# Patient Record
Sex: FEMALE | Race: WHITE | NOT HISPANIC OR LATINO | Employment: OTHER | URBAN - METROPOLITAN AREA
[De-identification: names, ages, dates, MRNs, and addresses within clinical notes are randomized per-mention and may not be internally consistent; named-entity substitution may affect disease eponyms.]

---

## 2017-01-27 ENCOUNTER — IMPORTED ENCOUNTER (OUTPATIENT)
Dept: URBAN - METROPOLITAN AREA CLINIC 50 | Facility: CLINIC | Age: 67
End: 2017-01-27

## 2017-10-23 ENCOUNTER — IMPORTED ENCOUNTER (OUTPATIENT)
Dept: URBAN - METROPOLITAN AREA CLINIC 50 | Facility: CLINIC | Age: 67
End: 2017-10-23

## 2018-04-27 ENCOUNTER — IMPORTED ENCOUNTER (OUTPATIENT)
Dept: URBAN - METROPOLITAN AREA CLINIC 50 | Facility: CLINIC | Age: 68
End: 2018-04-27

## 2018-09-07 ENCOUNTER — IMPORTED ENCOUNTER (OUTPATIENT)
Dept: URBAN - METROPOLITAN AREA CLINIC 50 | Facility: CLINIC | Age: 68
End: 2018-09-07

## 2018-10-05 ENCOUNTER — IMPORTED ENCOUNTER (OUTPATIENT)
Dept: URBAN - METROPOLITAN AREA CLINIC 50 | Facility: CLINIC | Age: 68
End: 2018-10-05

## 2019-01-14 ENCOUNTER — IMPORTED ENCOUNTER (OUTPATIENT)
Dept: URBAN - METROPOLITAN AREA CLINIC 50 | Facility: CLINIC | Age: 69
End: 2019-01-14

## 2019-03-04 ENCOUNTER — IMPORTED ENCOUNTER (OUTPATIENT)
Dept: URBAN - METROPOLITAN AREA CLINIC 50 | Facility: CLINIC | Age: 69
End: 2019-03-04

## 2019-03-05 ENCOUNTER — IMPORTED ENCOUNTER (OUTPATIENT)
Dept: URBAN - METROPOLITAN AREA CLINIC 50 | Facility: CLINIC | Age: 69
End: 2019-03-05

## 2019-04-22 ENCOUNTER — IMPORTED ENCOUNTER (OUTPATIENT)
Dept: URBAN - METROPOLITAN AREA CLINIC 50 | Facility: CLINIC | Age: 69
End: 2019-04-22

## 2019-04-29 ENCOUNTER — IMPORTED ENCOUNTER (OUTPATIENT)
Dept: URBAN - METROPOLITAN AREA CLINIC 50 | Facility: CLINIC | Age: 69
End: 2019-04-29

## 2019-05-09 ENCOUNTER — IMPORTED ENCOUNTER (OUTPATIENT)
Dept: URBAN - METROPOLITAN AREA CLINIC 50 | Facility: CLINIC | Age: 69
End: 2019-05-09

## 2019-05-09 NOTE — PATIENT DISCUSSION
"""S/P IOL OS: Tecnis ZCB00 23.5 (Target: [place]) +Femto/Arcs +Omidria. Continue post operative instructions and drops per schedule.  """

## 2019-05-15 ENCOUNTER — IMPORTED ENCOUNTER (OUTPATIENT)
Dept: URBAN - METROPOLITAN AREA CLINIC 50 | Facility: CLINIC | Age: 69
End: 2019-05-15

## 2019-05-15 NOTE — PATIENT DISCUSSION
"""S/P IOL OS: Tecnis ZCB00 23.5 (Target: [place]) +Femto/Arcs +Omidria. Continue post operative instructions and drops per schedule.  """

## 2019-05-23 ENCOUNTER — IMPORTED ENCOUNTER (OUTPATIENT)
Dept: URBAN - METROPOLITAN AREA CLINIC 50 | Facility: CLINIC | Age: 69
End: 2019-05-23

## 2019-05-23 NOTE — PATIENT DISCUSSION
"""S/P IOL OD: Tecnis ZCB00 23.5 (Target: [place]) +Femto/Arcs +Omidria. Continue post operative instructions and drops per schedule.  """

## 2019-05-30 ENCOUNTER — IMPORTED ENCOUNTER (OUTPATIENT)
Dept: URBAN - METROPOLITAN AREA CLINIC 50 | Facility: CLINIC | Age: 69
End: 2019-05-30

## 2019-05-30 NOTE — PATIENT DISCUSSION
"""S/P IOL OD: Tecnis ZCB00 23.5 (Target: [place]) +Femto/Arcs +Omidria. Continue post operative instructions and drops per schedule.  """

## 2019-07-10 ENCOUNTER — IMPORTED ENCOUNTER (OUTPATIENT)
Dept: URBAN - METROPOLITAN AREA CLINIC 50 | Facility: CLINIC | Age: 69
End: 2019-07-10

## 2019-07-10 NOTE — PATIENT DISCUSSION
"""Discussed dry eye diagnosis with patient.  Educated patient on proper lid hygiene and stressed importance of lid massages and the use of warm compresses and artificial tears."" ""Continue Oasis Preservative Free Tears both eyes two - four times a day ."" ""Start Warm compresses both eyes at bedtime

## 2019-10-07 ENCOUNTER — IMPORTED ENCOUNTER (OUTPATIENT)
Dept: URBAN - METROPOLITAN AREA CLINIC 50 | Facility: CLINIC | Age: 69
End: 2019-10-07

## 2019-10-07 NOTE — PATIENT DISCUSSION
"""""""Follow drusen without treatment. Call if vision or distortion increases. Recommend regular amsler checks. """

## 2020-02-21 ENCOUNTER — IMPORTED ENCOUNTER (OUTPATIENT)
Dept: URBAN - METROPOLITAN AREA CLINIC 50 | Facility: CLINIC | Age: 70
End: 2020-02-21

## 2020-02-21 NOTE — PATIENT DISCUSSION
"""Start Erythromycin Ointment both eyes at bedtime . "" ""Start Warm compresses both eyes twice a day

## 2020-03-13 ENCOUNTER — IMPORTED ENCOUNTER (OUTPATIENT)
Dept: URBAN - METROPOLITAN AREA CLINIC 50 | Facility: CLINIC | Age: 70
End: 2020-03-13

## 2020-08-07 ENCOUNTER — IMPORTED ENCOUNTER (OUTPATIENT)
Dept: URBAN - METROPOLITAN AREA CLINIC 50 | Facility: CLINIC | Age: 70
End: 2020-08-07

## 2020-11-02 ENCOUNTER — IMPORTED ENCOUNTER (OUTPATIENT)
Dept: URBAN - METROPOLITAN AREA CLINIC 50 | Facility: CLINIC | Age: 70
End: 2020-11-02

## 2021-04-18 ASSESSMENT — VISUAL ACUITY
OD_OTHER: >20/400.
OS_PH: 20/20-1
OD_CC: 20/40
OS_CC: 20/60
OD_SC: 20/20
OD_BAT: 20/30
OS_CC: 20/40
OD_OTHER: 20/30. 20/60.
OD_CC: 20/25
OS_CC: 20/30
OS_CC: 20/30
OS_CC: J1
OS_SC: 20/20
OS_OTHER: 20/40. 20/40.
OD_SC: 20/25-1
OS_OTHER: 20/80. 20/200.
OS_BAT: 20/40
OD_BAT: 20/30
OS_SC: 20/20
OD_CC: J1
OD_SC: 20/25
OS_BAT: 20/40
OS_OTHER: >20/400.
OD_CC: 20/25
OS_SC: 20/20-
OS_CC: 20/20-
OS_SC: 20/20-2
OD_BAT: 20/30
OS_PH: @ 17 IN
OD_OTHER: 20/20. 20/25.
OS_CC: 20/40-
OD_BAT: >20/400
OD_CC: 20/25+2
OS_SC: 20/15
OS_OTHER: 20/20. 20/25.
OD_BAT: 20/80
OD_SC: 20/100
OS_SC: 20/30+1
OD_OTHER: 20/30. 20/60.
OD_SC: 20/20-2
OS_SC: 20/20
OD_OTHER: 20/30. 20/60.
OD_PH: @ 17 IN
OS_OTHER: 20/30. 20/40.
OD_BAT: 20/30
OD_BAT: 20/20
OS_BAT: 20/20
OS_BAT: >20/400
OS_BAT: 20/80
OD_SC: 20/20
OS_CC: 20/30
OS_CC: 20/30
OD_OTHER: 20/30. 20/30.
OD_SC: 20/20-1
OS_BAT: 20/30
OS_OTHER: 20/40. 20/70.
OD_SC: 20/20-2
OD_CC: 20/30-
OD_CC: 20/20
OD_CC: 20/40
OD_OTHER: 20/80. 20/200.

## 2021-04-18 ASSESSMENT — TONOMETRY
OD_IOP_MMHG: 14
OD_IOP_MMHG: 20
OS_IOP_MMHG: 16
OD_IOP_MMHG: 16
OS_IOP_MMHG: 21
OD_IOP_MMHG: 12
OD_IOP_MMHG: 16
OS_IOP_MMHG: 16
OS_IOP_MMHG: 14
OS_IOP_MMHG: 14
OS_IOP_MMHG: 19
OD_IOP_MMHG: 17
OS_IOP_MMHG: 20
OS_IOP_MMHG: 17
OS_IOP_MMHG: 16
OS_IOP_MMHG: 18
OS_IOP_MMHG: 10
OD_IOP_MMHG: 16
OD_IOP_MMHG: 16
OS_IOP_MMHG: 14
OD_IOP_MMHG: 13
OD_IOP_MMHG: 14
OD_IOP_MMHG: 19
OS_IOP_MMHG: 14
OD_IOP_MMHG: 18
OD_IOP_MMHG: 14
OS_IOP_MMHG: 18
OD_IOP_MMHG: 18

## 2021-06-02 NOTE — PATIENT DISCUSSION
"""Continue Artificial tears both eyes two - four times a day ."" ""Continue Erythromycin Ointment both eyes at bedtime . "" ""Continue Warm compresses both eyes twice a day normal mouth and gums/moist

## 2021-10-29 ENCOUNTER — PREPPED CHART (OUTPATIENT)
Dept: URBAN - METROPOLITAN AREA CLINIC 50 | Facility: CLINIC | Age: 71
End: 2021-10-29

## 2021-11-04 ENCOUNTER — COMPREHENSIVE EXAM (OUTPATIENT)
Dept: URBAN - METROPOLITAN AREA CLINIC 50 | Facility: CLINIC | Age: 71
End: 2021-11-04

## 2021-11-04 DIAGNOSIS — H26.493: ICD-10-CM

## 2021-11-04 DIAGNOSIS — H16.223: ICD-10-CM

## 2021-11-04 DIAGNOSIS — H35.363: ICD-10-CM

## 2021-11-04 DIAGNOSIS — H35.373: ICD-10-CM

## 2021-11-04 DIAGNOSIS — H02.834: ICD-10-CM

## 2021-11-04 DIAGNOSIS — H02.831: ICD-10-CM

## 2021-11-04 DIAGNOSIS — E11.9: ICD-10-CM

## 2021-11-04 PROCEDURE — 92134 CPTRZ OPH DX IMG PST SGM RTA: CPT

## 2021-11-04 PROCEDURE — 92014 COMPRE OPH EXAM EST PT 1/>: CPT

## 2021-11-04 ASSESSMENT — VISUAL ACUITY
OD_SC: 20/15
OD_GLARE: 20/20
OS_GLARE: 20/20
OS_SC: 20/15
OD_GLARE: 20/20
OS_GLARE: 20/20
OU_SC: 20/15

## 2021-11-04 ASSESSMENT — TONOMETRY
OS_IOP_MMHG: 16
OD_IOP_MMHG: 16

## 2021-11-04 NOTE — PATIENT DISCUSSION
Will need Ptosis VF and external photos to confirm patient meets medical insurance criteria for blepharoplasty. Patient wants to schedule at the beginning of the year.

## 2021-11-19 ENCOUNTER — DIAGNOSTICS - PTOSIS VF/PHOTOS (OUTPATIENT)
Dept: URBAN - METROPOLITAN AREA CLINIC 50 | Facility: CLINIC | Age: 71
End: 2021-11-19

## 2021-11-19 DIAGNOSIS — H02.831: ICD-10-CM

## 2021-11-19 DIAGNOSIS — H02.834: ICD-10-CM

## 2021-11-19 PROCEDURE — 92285 EXTERNAL OCULAR PHOTOGRAPHY: CPT

## 2021-11-19 PROCEDURE — 92082 INTERMEDIATE VISUAL FIELD XM: CPT

## 2022-02-18 ENCOUNTER — CONSULTATION/EVALUATION (OUTPATIENT)
Dept: URBAN - METROPOLITAN AREA CLINIC 52 | Facility: CLINIC | Age: 72
End: 2022-02-18

## 2022-02-18 DIAGNOSIS — H02.831: ICD-10-CM

## 2022-02-18 DIAGNOSIS — H02.834: ICD-10-CM

## 2022-02-18 PROCEDURE — 92012 INTRM OPH EXAM EST PATIENT: CPT

## 2022-02-18 ASSESSMENT — VISUAL ACUITY
OS_SC: 20/20-1
OD_SC: 20/20+1
OU_SC: 20/15

## 2022-02-18 ASSESSMENT — TONOMETRY
OD_IOP_MMHG: 16
OS_IOP_MMHG: 16

## 2022-02-18 NOTE — PATIENT DISCUSSION
D/w patient that there is less skin to remove on RUL than S[place], so brow would still not be even after blepharoplasty. Patient still wishes to proceed.

## 2022-04-06 ENCOUNTER — PRE-OP/H&P (OUTPATIENT)
Dept: URBAN - METROPOLITAN AREA CLINIC 49 | Facility: CLINIC | Age: 72
End: 2022-04-06

## 2022-04-06 VITALS
DIASTOLIC BLOOD PRESSURE: 77 MMHG | SYSTOLIC BLOOD PRESSURE: 135 MMHG | HEART RATE: 72 BPM | HEIGHT: 55 IN | RESPIRATION RATE: 16 BRPM

## 2022-04-06 DIAGNOSIS — H02.831: ICD-10-CM

## 2022-04-06 DIAGNOSIS — H02.834: ICD-10-CM

## 2022-04-06 PROCEDURE — PREOP PRE OP VISIT

## 2022-04-06 ASSESSMENT — TONOMETRY
OS_IOP_MMHG: 13
OD_IOP_MMHG: 12

## 2022-04-06 ASSESSMENT — VISUAL ACUITY
OD_SC: 20/20
OS_SC: 20/20
OU_SC: 20/20

## 2022-04-06 NOTE — PATIENT DISCUSSION
Instructed to call immediately if any new distortion, blurring, decreased vision or eye pain. I have personally performed a face to face diagnostic evaluation on this patient. I have reviewed the ACP note and agree with the history, exam and plan of care, except as noted.

## 2022-04-12 ENCOUNTER — SURGERY/PROCEDURE (OUTPATIENT)
Dept: URBAN - METROPOLITAN AREA SURGERY 16 | Facility: SURGERY | Age: 72
End: 2022-04-12

## 2022-04-12 DIAGNOSIS — H02.831: ICD-10-CM

## 2022-04-12 DIAGNOSIS — H02.834: ICD-10-CM

## 2022-04-12 PROCEDURE — 1582350 UPPER BLEPH PER EYE FUNCTIONAL-BILATERAL

## 2022-04-12 NOTE — PROCEDURE NOTE: SURGICAL
"<p class=""MsoNormal"" style=""margin-bottom:0in;line-height:normal""><span style=""poz-aywrh-ahjo-family:calibri;vem-fkbledo-cuac-family:times new beoni;vbl-ziujg-hjzr-family:calibri;dxw-jhvr-enlv-family:calibri;color:black"">PREOPERATIVEDIAGNOSIS: Dermatochalasis

## 2022-04-21 ENCOUNTER — POST-OP (OUTPATIENT)
Dept: URBAN - METROPOLITAN AREA CLINIC 53 | Facility: CLINIC | Age: 72
End: 2022-04-21

## 2022-04-21 DIAGNOSIS — Z98.890: ICD-10-CM

## 2022-04-21 PROCEDURE — 99024 POSTOP FOLLOW-UP VISIT: CPT

## 2022-04-21 ASSESSMENT — VISUAL ACUITY
OU_SC: 20/20
OS_SC: 20/20-2
OD_SC: 20/20

## 2022-05-13 ENCOUNTER — POST-OP (OUTPATIENT)
Dept: URBAN - METROPOLITAN AREA CLINIC 52 | Facility: CLINIC | Age: 72
End: 2022-05-13

## 2022-05-13 DIAGNOSIS — Z98.890: ICD-10-CM

## 2022-05-13 PROCEDURE — 92285 EXTERNAL OCULAR PHOTOGRAPHY: CPT

## 2022-05-13 PROCEDURE — 99024 POSTOP FOLLOW-UP VISIT: CPT

## 2022-05-13 ASSESSMENT — VISUAL ACUITY
OS_SC: 20/20
OD_SC: 20/25

## 2022-11-17 ENCOUNTER — COMPREHENSIVE EXAM (OUTPATIENT)
Dept: URBAN - METROPOLITAN AREA CLINIC 50 | Facility: CLINIC | Age: 72
End: 2022-11-17

## 2022-11-17 DIAGNOSIS — H26.493: ICD-10-CM

## 2022-11-17 DIAGNOSIS — E11.9: ICD-10-CM

## 2022-11-17 DIAGNOSIS — H35.363: ICD-10-CM

## 2022-11-17 DIAGNOSIS — H35.373: ICD-10-CM

## 2022-11-17 DIAGNOSIS — H43.393: ICD-10-CM

## 2022-11-17 DIAGNOSIS — H10.13: ICD-10-CM

## 2022-11-17 PROCEDURE — 92014 COMPRE OPH EXAM EST PT 1/>: CPT

## 2022-11-17 PROCEDURE — 92134 CPTRZ OPH DX IMG PST SGM RTA: CPT

## 2022-11-17 ASSESSMENT — KERATOMETRY
OS_AXISANGLE2_DEGREES: 92
OD_K2POWER_DIOPTERS: 44.00
OS_AXISANGLE_DEGREES: 002
OD_AXISANGLE2_DEGREES: 89
OS_K1POWER_DIOPTERS: 43.75
OS_K2POWER_DIOPTERS: 44.25
OD_K1POWER_DIOPTERS: 43.50
OD_AXISANGLE_DEGREES: 179

## 2022-11-17 ASSESSMENT — VISUAL ACUITY
OS_SC: 20/20-1
OD_GLARE: 20/20
OU_SC: 20/20
OD_GLARE: 20/20
OS_GLARE: 20/25
OS_GLARE: 20/20
OU_SC: J3@16"
OD_SC: 20/20

## 2022-11-17 ASSESSMENT — TONOMETRY
OD_IOP_MMHG: 16
OS_IOP_MMHG: 16

## 2022-11-17 NOTE — PATIENT DISCUSSION
Patient is complaining of itching and trace papillae OU noted on today's exam. Discussed that it is allergy season and gave patient a coupon for Pataday Extra Strength.

## 2023-08-04 ENCOUNTER — EMERGENCY VISIT (OUTPATIENT)
Dept: URBAN - METROPOLITAN AREA CLINIC 24 | Facility: CLINIC | Age: 73
End: 2023-08-04

## 2023-08-04 DIAGNOSIS — D23.111: ICD-10-CM

## 2023-08-04 PROCEDURE — 92012 INTRM OPH EXAM EST PATIENT: CPT

## 2023-08-04 ASSESSMENT — VISUAL ACUITY
OU_SC: 20/15-1
OS_SC: 20/15-2
OD_SC: 20/15-3

## 2023-08-04 ASSESSMENT — KERATOMETRY
OD_AXISANGLE2_DEGREES: 89
OS_K2POWER_DIOPTERS: 44.25
OD_K2POWER_DIOPTERS: 44.00
OS_K1POWER_DIOPTERS: 43.75
OD_AXISANGLE_DEGREES: 179
OD_K1POWER_DIOPTERS: 43.50
OS_AXISANGLE2_DEGREES: 92
OS_AXISANGLE_DEGREES: 002

## 2023-08-04 ASSESSMENT — TONOMETRY
OD_IOP_MMHG: 15
OS_IOP_MMHG: 15

## 2023-08-25 ENCOUNTER — CONSULTATION/EVALUATION (OUTPATIENT)
Dept: URBAN - METROPOLITAN AREA CLINIC 52 | Facility: CLINIC | Age: 73
End: 2023-08-25

## 2023-08-25 DIAGNOSIS — D23.111: ICD-10-CM

## 2023-08-25 PROCEDURE — 92285 EXTERNAL OCULAR PHOTOGRAPHY: CPT

## 2023-08-25 PROCEDURE — 92012 INTRM OPH EXAM EST PATIENT: CPT

## 2023-08-25 ASSESSMENT — KERATOMETRY
OD_K1POWER_DIOPTERS: 43.50
OS_K2POWER_DIOPTERS: 44.25
OD_AXISANGLE_DEGREES: 179
OS_K1POWER_DIOPTERS: 43.75
OS_AXISANGLE2_DEGREES: 92
OS_AXISANGLE_DEGREES: 002
OD_AXISANGLE2_DEGREES: 89
OD_K2POWER_DIOPTERS: 44.00

## 2023-08-25 ASSESSMENT — VISUAL ACUITY
OS_SC: 20/20
OD_SC: 20/20

## 2023-08-25 ASSESSMENT — TONOMETRY
OD_IOP_MMHG: 15
OS_IOP_MMHG: 16

## 2024-01-24 ENCOUNTER — COMPREHENSIVE EXAM (OUTPATIENT)
Dept: URBAN - METROPOLITAN AREA CLINIC 48 | Facility: LOCATION | Age: 74
End: 2024-01-24

## 2024-01-24 DIAGNOSIS — H43.393: ICD-10-CM

## 2024-01-24 DIAGNOSIS — H26.493: ICD-10-CM

## 2024-01-24 DIAGNOSIS — H35.363: ICD-10-CM

## 2024-01-24 DIAGNOSIS — E11.9: ICD-10-CM

## 2024-01-24 DIAGNOSIS — H35.373: ICD-10-CM

## 2024-01-24 DIAGNOSIS — E11.3291: ICD-10-CM

## 2024-01-24 PROCEDURE — 92134 CPTRZ OPH DX IMG PST SGM RTA: CPT

## 2024-01-24 PROCEDURE — 99214 OFFICE O/P EST MOD 30 MIN: CPT

## 2024-01-24 ASSESSMENT — TONOMETRY
OS_IOP_MMHG: 16
OD_IOP_MMHG: 16

## 2024-01-24 ASSESSMENT — KERATOMETRY
OD_K2POWER_DIOPTERS: 43.00
OS_AXISANGLE_DEGREES: 180
OS_AXISANGLE2_DEGREES: 90
OD_K1POWER_DIOPTERS: 43.50
OS_K2POWER_DIOPTERS: 43.50
OD_AXISANGLE_DEGREES: 20
OD_AXISANGLE2_DEGREES: 110
OS_K1POWER_DIOPTERS: 43.50

## 2024-01-24 ASSESSMENT — VISUAL ACUITY
OS_GLARE: 20/30-2
OU_CC: J1+@14"
OU_SC: 20/20
OS_GLARE: 20/25-1
OD_GLARE: 20/20
OD_GLARE: 20/20
OS_SC: 20/20
OD_SC: 20/20

## 2024-05-20 ENCOUNTER — EMERGENCY VISIT (OUTPATIENT)
Dept: URBAN - METROPOLITAN AREA CLINIC 52 | Facility: CLINIC | Age: 74
End: 2024-05-20

## 2024-05-20 DIAGNOSIS — H11.153: ICD-10-CM

## 2024-05-20 DIAGNOSIS — H00.024: ICD-10-CM

## 2024-05-20 PROCEDURE — 99214 OFFICE O/P EST MOD 30 MIN: CPT

## 2024-05-20 RX ORDER — TOBRAMYCIN AND DEXAMETHASONE 1; 3 MG/ML; MG/ML
1 SUSPENSION/ DROPS OPHTHALMIC
Start: 2024-05-20 | End: 2024-05-27

## 2024-05-20 ASSESSMENT — KERATOMETRY
OD_K2POWER_DIOPTERS: 43.00
OS_AXISANGLE2_DEGREES: 90
OS_K2POWER_DIOPTERS: 43.50
OD_K1POWER_DIOPTERS: 43.50
OD_AXISANGLE_DEGREES: 20
OS_K1POWER_DIOPTERS: 43.50
OD_AXISANGLE2_DEGREES: 110
OS_AXISANGLE_DEGREES: 180

## 2024-05-20 ASSESSMENT — VISUAL ACUITY
OS_SC: 20/20
OD_SC: 20/20

## 2024-05-20 ASSESSMENT — TONOMETRY
OD_IOP_MMHG: 14
OS_IOP_MMHG: 10

## 2025-02-20 ENCOUNTER — COMPREHENSIVE EXAM (OUTPATIENT)
Age: 75
End: 2025-02-20

## 2025-02-20 DIAGNOSIS — H35.373: ICD-10-CM

## 2025-02-20 DIAGNOSIS — H35.363: ICD-10-CM

## 2025-02-20 DIAGNOSIS — H02.056: ICD-10-CM

## 2025-02-20 PROCEDURE — 99214 OFFICE O/P EST MOD 30 MIN: CPT

## 2025-02-20 PROCEDURE — 67820 REVISE EYELASHES: CPT | Mod: E2

## 2025-02-20 PROCEDURE — 92134 CPTRZ OPH DX IMG PST SGM RTA: CPT
